# Patient Record
Sex: FEMALE | Race: WHITE | NOT HISPANIC OR LATINO
[De-identification: names, ages, dates, MRNs, and addresses within clinical notes are randomized per-mention and may not be internally consistent; named-entity substitution may affect disease eponyms.]

---
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
## 2024-02-25 ENCOUNTER — EMERGENCY (EMERGENCY)
Facility: HOSPITAL | Age: 19
LOS: 1 days | Discharge: HOME | End: 2024-02-25
Admitting: STUDENT IN AN ORGANIZED HEALTH CARE EDUCATION/TRAINING PROGRAM
Payer: COMMERCIAL

## 2024-02-25 VITALS
DIASTOLIC BLOOD PRESSURE: 64 MMHG | TEMPERATURE: 98 F | HEART RATE: 90 BPM | OXYGEN SATURATION: 100 % | RESPIRATION RATE: 16 BRPM | SYSTOLIC BLOOD PRESSURE: 103 MMHG

## 2024-02-25 VITALS
SYSTOLIC BLOOD PRESSURE: 101 MMHG | TEMPERATURE: 98 F | OXYGEN SATURATION: 98 % | RESPIRATION RATE: 16 BRPM | DIASTOLIC BLOOD PRESSURE: 66 MMHG | HEART RATE: 82 BPM

## 2024-02-25 DIAGNOSIS — N89.6 TIGHT HYMENAL RING: Chronic | ICD-10-CM

## 2024-02-25 PROCEDURE — 73110 X-RAY EXAM OF WRIST: CPT | Mod: 26,RT

## 2024-02-25 PROCEDURE — 73080 X-RAY EXAM OF ELBOW: CPT | Mod: 26,RT

## 2024-02-25 PROCEDURE — 73130 X-RAY EXAM OF HAND: CPT | Mod: 26,RT

## 2024-02-25 PROCEDURE — 25650 CLTX ULNAR STYLOID FRACTURE: CPT | Mod: 54,RT

## 2024-02-25 PROCEDURE — 99284 EMERGENCY DEPT VISIT MOD MDM: CPT | Mod: 57

## 2024-02-25 NOTE — ED ADULT NURSE NOTE - OBJECTIVE STATEMENT
pt received to intake room 12 a&ox4 denies past medical Hx c/o right wrist pain s/p fall from snowboard onto right wrist. arrives with splint and sling [place] place [place]. sensory motor in tact. full ROM to bilateral digits. provided with cold packs. pending XR results. denies chest pain, sob, nausea, vomiting, dizziness, headache ,fevers/chills. denies head strike/LOC/ blood thinner use. respirations even and unlabored with no accessory muscle use. pending dispo

## 2024-02-25 NOTE — ED PROVIDER NOTE - CLINICAL SUMMARY MEDICAL DECISION MAKING FREE TEXT BOX
18-year-old female no past medical history presents ED complaining of right wrist injury earlier today.  Patient states was snowboarding when she fell onto her right wrist while it was behind her back.  States had severe pain had swelling to her wrist.  Patient [place] in [place] and sent to the ER.  Denies any other injuries.  Patient is left-hand dominant.  Denies any numbness or weakness.  right wrist swelling and TTP, r/o fx- xrays

## 2024-02-25 NOTE — ED ADULT TRIAGE NOTE - CHIEF COMPLAINT QUOTE
Pt c/o R wrist injury. Pt snowboarding today and fell on R wrist. pt arrives with splint and [place]ing [place]. full ROM of fingers. denies head trauma, LOC, or blood thinner use. denies Hx.

## 2024-02-25 NOTE — ED PROVIDER NOTE - NSFOLLOWUPINSTRUCTIONS_ED_ALL_ED_FT
Take 3 advil every 8 hours as needed for pain.  Keep wrist in splint.  Avoid any strenuous activity.  Ice, rest, elevate.  Follow up with an orthopedic within one week.  Return to ED for any worsening pain, swelling, numbness, redness or fever.

## 2024-02-25 NOTE — ED PROVIDER NOTE - PATIENT PORTAL LINK FT
You can access the FollowMyHealth Patient Portal offered by [facility] by registering at the following website: [email or web address]. By joining [email or web address]’s FollowMyHealth portal, you will also be able to view your health information using other applications (apps) compatible with our system.

## 2024-02-25 NOTE — ED PROVIDER NOTE - OBJECTIVE STATEMENT
18-year-old female no past medical history presents ED complaining of right wrist injury earlier today.  Patient states was snowboarding when she fell onto her right wrist while it was behind her back.  States had severe pain had swelling to her wrist.  Patient [place] and [place] to [place] ER.  Took 2 tylenol prior to arrival.  Denies any other injuries.  Patient is left-hand dominant.  Denies any numbness or weakness.

## 2024-02-28 ENCOUNTER — APPOINTMENT (OUTPATIENT)
Dept: ORTHOPEDIC SURGERY | Facility: CLINIC | Age: 19
End: 2024-02-28
Payer: COMMERCIAL

## 2024-02-28 VITALS — BODY MASS INDEX: 18.4 KG/M2 | WEIGHT: 100 LBS | HEIGHT: 62 IN

## 2024-02-28 PROCEDURE — 99204 OFFICE O/P NEW MOD 45 MIN: CPT | Mod: 25

## 2024-02-28 PROCEDURE — 29075 APPL CST ELBW FNGR SHORT ARM: CPT | Mod: RT

## 2024-02-28 PROCEDURE — 73110 X-RAY EXAM OF WRIST: CPT | Mod: 50

## 2024-02-28 NOTE — DISCUSSION/SUMMARY
[de-identified] : 18-year-old woman with a right nondisplaced radial styloid and ulnar styloid fracture, approximately 2 days out.   -The risks and benefits of operative versus nonoperative management were discussed at length with the patient. The patient shows a good understanding of the injury and treatment options. They would like to move forward with nonoperative management.  -NWB right UE in short arm cast -Short arm cast applied today -Keep cast clean and dry -Work on finger ROM to prevent stiffness -Elevation for swelling -Follow-up in 3 weeks with x-rays of the right wrist at that time.  -All of the patient's questions and concerns were addressed.

## 2024-02-28 NOTE — HISTORY OF PRESENT ILLNESS
[de-identified] : Ms. ROSIE COOPER is a 18 year old LHD woman presents today for initial evaluation of a right wrist injury sustained on 2/25/24 while snowboarding. She was seen at [facility] ED where x-rays were performed. She presents today in a splint. She notes pain at the right wrist. She is taking Advil for the pain with moderate relief.

## 2024-02-28 NOTE — PHYSICAL EXAM
[de-identified] : The patient is sitting comfortably in the exam room.  RIGHT wrist. -Skin is intact, swelling, no ecchymosis -Pronation 80, supination 90 -Tenderness to palpation over the radial styloid -No tenderness to palpation over the ulnar styloid -Able to make a fist -Sensation is intact median, radial, ulnar, axillary nerves -Pain with palpation over the radius -No pain with palpation over the ulna -Motor is intact median, radial, ulnar, axillary nerves -Hand is warm and well-perfused, Palpable radial and ulnar pulses   [de-identified] :  X-rays of bilateral wrists were taken in the office today, 2/28/24. AP, Oblique and Lateral.  X-rays show good overall alignment of bilateral wrist.  The DRUJ's are roughly equivalent.  The right wrist has a nondisplaced radial styloid fracture.  There is also a minimally displaced ulnar styloid fracture.  Scaphoid view of the right wrist shows good overall alignment of the scaphoid.  No fractures appreciated.  ACC: [id number] EXAM: XR HAND MIN 3 VIEWS RT ORDERED BY: COREEN LOPEZ  ACC: [id number] EXAM: XR ELBOW COMP MIN 3V RT ORDERED BY: COREEN LOPEZ  ACC: [id number] EXAM: XR WRIST COMP MIN 3 VIEWS RT ORDERED BY: COREEN LOPEZ  PROCEDURE DATE: 02/25/2024  INTERPRETATION: CLINICAL INFORMATION: Right wrist injury.  TECHNIQUE: 3 views of the right wrist, 3 views of the right hand, 3 views of the right elbow  COMPARISON: No comparison available.  FINDINGS: Right wrist and hand: Linear cortical lucency of the ulnar styloid, suggestive of acute nondisplaced fracture. Joint spaces are maintained Soft tissue swelling of the right wrist.  Right elbow: No acute fracture or dislocation. No elbow joint effusion.  IMPRESSION:  Acute nondisplaced right ulnar styloid fracture.  --- End of Report ---  DAVIE CHAMPION MD; Resident Radiologist This document has been electronically signed. EVANGELIST ELLSWORTH MD; Attending Radiologist This document has been electronically signed. Feb 25 2024 11:18PM

## 2024-02-29 ENCOUNTER — APPOINTMENT (OUTPATIENT)
Dept: ORTHOPEDIC SURGERY | Facility: CLINIC | Age: 19
End: 2024-02-29

## 2024-03-14 ENCOUNTER — APPOINTMENT (OUTPATIENT)
Dept: ORTHOPEDIC SURGERY | Facility: CLINIC | Age: 19
End: 2024-03-14
Payer: COMMERCIAL

## 2024-03-14 VITALS — BODY MASS INDEX: 18.4 KG/M2 | HEIGHT: 62 IN | WEIGHT: 100 LBS

## 2024-03-14 PROCEDURE — 99213 OFFICE O/P EST LOW 20 MIN: CPT

## 2024-03-14 PROCEDURE — 73110 X-RAY EXAM OF WRIST: CPT | Mod: RT

## 2024-03-14 NOTE — HISTORY OF PRESENT ILLNESS
[de-identified] : Ms. ROSIE COOPER is a 18 year old LHD woman presents today for follow up evaluation of a right nondisplaced radial styloid and ulnar styloid fracture sustained on 2/25/24, being treated nonoperatively in a short arm cast. Since her last visit she states she is feeling better. She has tolerated the short arm cast well.

## 2024-03-14 NOTE — DISCUSSION/SUMMARY
[de-identified] : 18-year-old woman with a right nondisplaced radial styloid and ulnar styloid fracture, approximately 2.5 weeks out, treated nonoperatively.   -X-ray and physical exam findings were discussed with the patient -NWB right UE in short arm cast -Keep cast clean and dry -Work on finger ROM to prevent stiffness -Elevation for swelling -Follow-up in 1 weeks with x-rays of the right wrist at that time.  -All of the patient's questions and concerns were addressed.

## 2024-03-14 NOTE — PHYSICAL EXAM
[de-identified] : The patient is sitting comfortably in the exam room.  RIGHT wrist. -Cast intact, clean and dry -Able to make a fist -Wiggles fingers -Sensation intact in fingers -Sensation intact throughout fingers -Fingers warm and well-perfused  [de-identified] : X-rays of right wrist were taken in the office today, 3/14/24. AP, Oblique and Lateral.  X-rays shows good overall alignment of the distal radius.  No displacement of the radial styloid fracture.  DRUJ is well aligned.

## 2024-03-20 ENCOUNTER — APPOINTMENT (OUTPATIENT)
Dept: ORTHOPEDIC SURGERY | Facility: CLINIC | Age: 19
End: 2024-03-20
Payer: COMMERCIAL

## 2024-03-20 PROCEDURE — 73110 X-RAY EXAM OF WRIST: CPT | Mod: RT

## 2024-03-20 PROCEDURE — 99213 OFFICE O/P EST LOW 20 MIN: CPT

## 2024-04-08 NOTE — PHYSICAL EXAM
[de-identified] : The patient is sitting comfortably in the exam room.  RIGHT wrist. -Skin is intact, no swelling, no ecchymosis -Pronation , supination -Able to make a fist -Sensation is intact median, radial, ulnar, axillary nerves -Motor is intact median, radial, ulnar, axillary nerves -Hand is warm and well-perfused, Palpable radial and ulnar pulses  [de-identified] : X-rays of right wrist were taken in the office today, 3/20/24. AP, Oblique and Lateral.  X-rays shows good overall alignment of the distal radius.  No displacement of the radial styloid fracture.  DRUJ is well aligned.

## 2024-04-08 NOTE — HISTORY OF PRESENT ILLNESS
[de-identified] : Ms. ROSIE COOPER is a 18 year old LHD woman presents today for follow up evaluation of a right nondisplaced radial styloid and ulnar styloid fracture sustained on 2/25/24, being treated nonoperatively in a short arm cast. Since her last visit she states she is feeling better. She has tolerated the short arm cast well.

## 2024-04-08 NOTE — DISCUSSION/SUMMARY
[de-identified] : 18-year-old woman with a right nondisplaced radial styloid and ulnar styloid fracture, approximately 3 weeks out, treated nonoperatively.   -X-ray and physical exam findings were discussed with the patient. The patient was adamant about her cast coming off at this time due to a trip. We recommended the cast stay on for 6 weeks as that is the timeframe for healing. We discussed the potential risks of removing the cast prior to 6 weeks. The patient understood and still wanted the cast removed today.  -NWB right UE  -Removable wrist brace prescribed -OT: work on wrist and finger ROM to prevent stiffness -Follow-up in 3 weeks with x-rays of the right wrist at that time.  -All of the patient's questions and concerns were addressed.

## 2024-04-11 ENCOUNTER — APPOINTMENT (OUTPATIENT)
Dept: ORTHOPEDIC SURGERY | Facility: CLINIC | Age: 19
End: 2024-04-11
Payer: COMMERCIAL

## 2024-04-11 DIAGNOSIS — S52.611A DISPLACED FRACTURE OF RIGHT ULNA STYLOID PROCESS, INITIAL ENCOUNTER FOR CLOSED FRACTURE: ICD-10-CM

## 2024-04-11 PROCEDURE — 99213 OFFICE O/P EST LOW 20 MIN: CPT

## 2024-04-11 PROCEDURE — 73110 X-RAY EXAM OF WRIST: CPT | Mod: RT

## 2024-04-11 NOTE — PHYSICAL EXAM
[de-identified] : The patient is sitting comfortably in the exam room.  RIGHT wrist. -Skin is intact, no swelling, no ecchymosis -Pronation 90 , supination 90 -Mild pain with palpation over the fracture site at the radius -Able to make a fist -Sensation is intact median, radial, ulnar, axillary nerves -Motor is intact median, radial, ulnar, axillary nerves -Hand is warm and well-perfused, Palpable radial and ulnar pulses  [de-identified] : X-rays of right wrist were taken in the office today, 4/11/24. AP, Oblique and Lateral.  X-rays shows good overall alignment of the distal radius.  No displacement of the radial styloid fracture.  DRUJ is well aligned.  There is interval healing at the fracture site of the extra-articular distal radius fracture.

## 2024-04-11 NOTE — DISCUSSION/SUMMARY
[de-identified] : 18-year-old woman with a right nondisplaced radial styloid and ulnar styloid fracture, approximately 6.5 weeks out, treated nonoperatively.   -X-ray and physical exam findings were discussed with the patient. -10lb weight limit right UE  -OT: work on wrist and finger ROM to prevent stiffness -Follow-up in 2 months with x-rays of the right wrist at that time.  -All of the patient's questions and concerns were addressed.

## 2024-04-11 NOTE — HISTORY OF PRESENT ILLNESS
[de-identified] : Ms. ROSIE COOPER is a 18 year old LHD woman presents today for follow up evaluation of a right nondisplaced radial styloid and ulnar styloid fracture sustained on 2/25/24, being treated nonoperatively in a short arm cast. Since her last visit she states she is feeling better. She notes pain when wearing the removable wrist brace but she is happy with her care and progress.

## 2024-06-13 ENCOUNTER — APPOINTMENT (OUTPATIENT)
Dept: ORTHOPEDIC SURGERY | Facility: CLINIC | Age: 19
End: 2024-06-13
Payer: COMMERCIAL

## 2024-06-13 VITALS — HEIGHT: 62 IN | BODY MASS INDEX: 18.4 KG/M2 | WEIGHT: 100 LBS

## 2024-06-13 DIAGNOSIS — S52.501A UNSPECIFIED FRACTURE OF THE LOWER END OF RIGHT RADIUS, INITIAL ENCOUNTER FOR CLOSED FRACTURE: ICD-10-CM

## 2024-06-13 PROCEDURE — 73110 X-RAY EXAM OF WRIST: CPT | Mod: RT

## 2024-06-13 PROCEDURE — 99213 OFFICE O/P EST LOW 20 MIN: CPT

## 2024-06-13 NOTE — HISTORY OF PRESENT ILLNESS
[de-identified] : Ms. ROSIE COOPER is a 18 year old LHD woman presents today for follow up evaluation of a right nondisplaced radial styloid and ulnar styloid fracture sustained on 2/25/24, being treated nonoperatively in a short arm cast. Since her last visit she states she is feeling better. She denies pain and is happy with her progress.

## 2024-06-13 NOTE — REASON FOR VISIT
[Follow-Up Visit] : a follow-up visit for [FreeTextEntry2] : right nondisplaced radial styloid and ulnar styloid fracture

## 2024-06-13 NOTE — PHYSICAL EXAM
[de-identified] : The patient is sitting comfortably in the exam room.  RIGHT wrist. -Skin is intact, no swelling, no ecchymosis -Pronation 90 , supination 90 -Mild pain with palpation over the fracture site at the radius -Able to make a fist -Sensation is intact median, radial, ulnar, axillary nerves -Motor is intact median, radial, ulnar, axillary nerves -Hand is warm and well-perfused, Palpable radial and ulnar pulses  [de-identified] : X-rays of right wrist were taken in the office today, 6/13/24. AP, Oblique and Lateral. X-rays shows good overall alignment of the distal radius.  No displacement of the radial styloid fracture.  DRUJ is well aligned.  There is interval healing at the fracture site of the extra-articular distal radius fracture.

## (undated) DEVICE — POSITIONER PATIENT SAFETY STRAP 3X60"

## (undated) DEVICE — SUT POLYSORB 4-0 18" P-12 UNDYED

## (undated) DEVICE — MEDICATION LABELS W MARKER

## (undated) DEVICE — VENODYNE/SCD SLEEVE CALF MEDIUM

## (undated) DEVICE — SOL IRR POUR NS 0.9% 500ML

## (undated) DEVICE — SOL IRR POUR H2O 250ML

## (undated) DEVICE — WARMING BLANKET LOWER ADULT

## (undated) DEVICE — SUT SOFSILK 2-0 18" C-23

## (undated) DEVICE — DRAPE TOWEL BLUE 17" X 24"

## (undated) DEVICE — DRAPE INSTRUMENT POUCH 6.75" X 11"

## (undated) DEVICE — LAP PAD 18 X 18"

## (undated) DEVICE — PACK MINOR

## (undated) DEVICE — SPONGE PEANUT REGULAR 3/8"

## (undated) DEVICE — SUT SURGIPRO II 3-0 18" C-14

## (undated) DEVICE — PREP BETADINE KIT

## (undated) DEVICE — DRSG DERMABOND 0.7ML